# Patient Record
Sex: FEMALE | Race: WHITE
[De-identification: names, ages, dates, MRNs, and addresses within clinical notes are randomized per-mention and may not be internally consistent; named-entity substitution may affect disease eponyms.]

---

## 2023-03-30 ENCOUNTER — HOSPITAL ENCOUNTER (EMERGENCY)
Dept: HOSPITAL 75 - ER | Age: 23
Discharge: HOME | End: 2023-03-30
Payer: COMMERCIAL

## 2023-03-30 VITALS — DIASTOLIC BLOOD PRESSURE: 80 MMHG | SYSTOLIC BLOOD PRESSURE: 125 MMHG

## 2023-03-30 VITALS — HEIGHT: 66.93 IN | BODY MASS INDEX: 32.94 KG/M2 | WEIGHT: 209.88 LBS

## 2023-03-30 DIAGNOSIS — R04.0: Primary | ICD-10-CM

## 2023-03-30 LAB
BASOPHILS # BLD AUTO: 0.1 10^3/UL (ref 0–0.1)
BASOPHILS NFR BLD AUTO: 1 % (ref 0–10)
EOSINOPHIL # BLD AUTO: 0.1 10^3/UL (ref 0–0.3)
EOSINOPHIL NFR BLD AUTO: 1 % (ref 0–10)
HCT VFR BLD CALC: 36 % (ref 35–52)
HGB BLD-MCNC: 12.6 G/DL (ref 11.5–16)
LYMPHOCYTES # BLD AUTO: 3 X 10^3 (ref 1–4)
LYMPHOCYTES NFR BLD AUTO: 29 % (ref 12–44)
MANUAL DIFFERENTIAL PERFORMED BLD QL: NO
MCH RBC QN AUTO: 30 PG (ref 25–34)
MCHC RBC AUTO-ENTMCNC: 35 G/DL (ref 32–36)
MCV RBC AUTO: 86 FL (ref 80–99)
MONOCYTES # BLD AUTO: 0.6 X 10^3 (ref 0–1)
MONOCYTES NFR BLD AUTO: 6 % (ref 0–12)
NEUTROPHILS # BLD AUTO: 6.5 X 10^3 (ref 1.8–7.8)
NEUTROPHILS NFR BLD AUTO: 63 % (ref 42–75)
PLATELET # BLD: 317 10^3/UL (ref 130–400)
PMV BLD AUTO: 8.9 FL (ref 9–12.2)
WBC # BLD AUTO: 10.4 10^3/UL (ref 4.3–11)

## 2023-03-30 PROCEDURE — 85025 COMPLETE CBC W/AUTO DIFF WBC: CPT

## 2023-03-30 PROCEDURE — 36415 COLL VENOUS BLD VENIPUNCTURE: CPT

## 2023-03-30 NOTE — ED EENT
History of Present Illness


General


Chief Complaint:  Nasal Problems


Stated Complaint:  NOSE BLEEDS


Source:  patient


Exam Limitations:  no limitations


 (SHAHIDA GUEVARA)





History of Present Illness


Date Seen by Provider:  Mar 30, 2023


Time Seen by Provider:  14:30


 (SHAHIDA GUEVARA)


Initial Comments


22-year-old female presents for left-sided nosebleed.  Symptoms present since 

about 1230.  She has had nosebleeds her whole life but never 1 or lasted this 

long.  She states she is also had large clots from her rectum area.  No trauma. 

She is not on any blood thinning medications specifically but does state that 

she took aspirin for headache pain last night.





All other systems reviewed and negative except documented per HPI.





Voice recognition software was used to help create this chart


 (MERE MILLS DO)





Allergies and Home Medications


Allergies


Coded Allergies:  


     No Known Drug Allergies (Unverified , 3/30/23)





Patient Home Medication List


Home Medication List Reviewed:  Yes


 (MERE MILLS DO)





Review of Systems


Review of Systems


Constitutional:  see HPI (MERE MILLS DO)





Past Medical-Social-Family Hx


Patient Social History


Tobacco Use?:  No


Use of E-Cig and/or Vaping dev:  No


Substance use?:  No


Alcohol Use?:  No


 (MERE MILLS DO)





Physical Exam


Vital Signs





Vital Signs - First Documented








 3/30/23





 14:15


 


Temp 36.8


 


Pulse 77


 


Resp 18


 


B/P (MAP) 130/89 (103)


 


Pulse Ox 98


 


O2 Delivery Room Air





 (MERE MILLS DO)


Height, Weight, BMI


Height: '"


Weight: lbs. oz. kg;  BMI


Method:


 


 (SHAHIDA GUEVARA)


General Appearance:  WD/WN, no apparent distress


Eyes:  bilateral eye normal inspection, bilateral eye PERRL, bilateral eye EOMI


Ears:  bilateral ear auricle normal, bilateral ear canal normal, bilateral ear 

TM normal


Nose:  other (Anterior left-sided nosebleed.  There is no area with a small 

vesicle that is the area of bleeding.  No septal hematoma.)


Mouth/Throat:  normal mouth inspection, other (Blood in the posterior oropharynx

from her nose.)


Cardiovascular:  regular rate, rhythm, no murmur


Respiratory:  chest non-tender, normal breath sounds


Neurologic/Psychiatric:  alert, oriented x 3


Skin:  normal color, warm/dry (MERE MILLS DO)





Progress/Results/Core Measures


Results/Orders


Lab Results





Laboratory Tests








Test


 3/30/23


14:57 Range/Units


 


 


White Blood Count


 10.4 


 4.3-11.0


10^3/uL


 


Red Blood Count


 4.24 


 3.80-5.11


10^6/uL


 


Hemoglobin 12.6  11.5-16.0  g/dL


 


Hematocrit 36  35-52  %


 


Mean Corpuscular Volume 86  80-99  fL


 


Mean Corpuscular Hemoglobin 30  25-34  pg


 


Mean Corpuscular Hemoglobin


Concent 35 


 32-36  g/dL





 


Red Cell Distribution Width 12.5  10.0-14.5  %


 


Platelet Count


 317 


 130-400


10^3/uL


 


Mean Platelet Volume 8.9 L 9.0-12.2  fL


 


Immature Granulocyte % (Auto) 0   %


 


Neutrophils (%) (Auto) 63  42-75  %


 


Lymphocytes (%) (Auto) 29  12-44  %


 


Monocytes (%) (Auto) 6  0-12  %


 


Eosinophils (%) (Auto) 1  0-10  %


 


Basophils (%) (Auto) 1  0-10  %


 


Neutrophils # (Auto) 6.5  1.8-7.8  X 10^3


 


Lymphocytes # (Auto) 3.0  1.0-4.0  X 10^3


 


Monocytes # (Auto) 0.6  0.0-1.0  X 10^3


 


Eosinophils # (Auto)


 0.1 


 0.0-0.3


10^3/uL


 


Basophils # (Auto)


 0.1 


 0.0-0.1


10^3/uL


 


Immature Granulocyte # (Auto)


 0.0 


 0.0-0.1


10^3/uL





 (MERE MILLS DO)


My Orders





Orders - MERE MILLS DO


Oxymetazoline 0.05% Nasal Spry (Afrin 0. (3/30/23 14:47)


 (MEER MILLS DO)


Medications Given in ED





Current Medications








 Medications  Dose


 Ordered  Sig/Lamont


 Route  Start Time


 Stop Time Status Last Admin


Dose Admin


 


 Oxymetazoline HCl  30 ml  STK-MED ONCE


 .ROUTE  3/30/23 14:47


 3/30/23 14:48 DC 3/30/23 14:51


30 ML


 


 Tranexamic Acid  10 MG/KG  ONCE  ONCE


 NA  3/30/23 14:30


 3/30/23 14:31 DC 3/30/23 14:51


1,000 MG





 (MERE MILLS DO)


Vital Signs/I&O











 3/30/23





 14:15


 


Temp 36.8


 


Pulse 77


 


Resp 18


 


B/P (MAP) 130/89 (103)


 


Pulse Ox 98


 


O2 Delivery Room Air





 (MERE MILLS DO)





Departure


Communication (Admissions)


Patient is hemodynamically stable.  Large amount of bleeding with blood clots on

arrival.  Use Afrin, TXA and chemical cautery silver nitrate stick as well as 

pressure.  After 30 minutes of pressure there is no obvious recurrence of 

bleeding.  I took off the clamp and had a period of observation and when she had

 no recurrence of bleeding.  She will be discharged home in stable condition 

with strict return precautions.  Advised her if she has recurrence of bleeding 

she should use the Afrin and placed a clamp for 45 minutes.  If she has bleeding

after this she needs to return to the emergency department.  She states 

understanding.


 (MERE MILLS DO)





Impression





   Primary Impression:  


   Epistaxis


Disposition:  01 HOME, SELF-CARE


Condition:  Stable





Departure-Patient Inst.


Referrals:  


NO,LOCAL PHYSICIAN (PCP/Family)


Primary Care Physician


Patient Instructions:  Nosebleeds ED





Add. Discharge Instructions:  


If you do start bleeding again, use the Afrin immediately and place the clamp 

for about 45 minutes.  Remove the clamp and if you still have bleeding replace 

the clamp and come to the emergency department.  Do not blow your nose for 48 

hours.  Follow-up with your primary doctor for any nonemergent needs.





All discharge instructions reviewed with patient and/or family. Voiced 

understanding.











SHAHIDA GUEVARA           Mar 30, 2023 14:30


MERE MILLS DO            Mar 30, 2023 15:01